# Patient Record
(demographics unavailable — no encounter records)

---

## 2024-10-09 NOTE — HISTORY OF PRESENT ILLNESS
[FreeTextEntry1] : Ms. SAUL AMADOR is a 59 year old female with a past medical history of PTC s/p total thyroidectomy, Iatrogenic hypothyroidism, Diabetes Mellitus Type 2  who presents for management.  Patient has not been able to exercise.  She has a grandson with cystic fibrosis that has required at least 10 surgeries.  She helps taking care of the grandson who lives with her. She had another grandchild that was recently born with [place] syndrome.  She has cut down on her metformin due to low blood sugars.  Re Thyroid Patient was first diagnosed with thyroid disorder in 2020. She had nodules for many years before that. She did not have JOHN. She  takes levothyroxine 150 mcg QD. Patient takes it in an empty stomach 30-60 mins before breakfast in the morning.   Re Diabetes Mellitus Patient denies any history of diabetic retinopathy, nephropathy or neuropathy. He denies any blurry vision, polyuria, polydipsia and numbness/tingling in the extremities.   POCT Glucose: mg/dL POCT Hga1c:%   Diabetes first diagnosed: 2 Type:10+ years  Current diabetic regimen: Metformin ER 1000 mg BID Other relevant medications:  Self monitoring blood glucose : Sometimes  SMBG: Pre-breakfast: Pre-lunch: Pre-dinner: bedtime:  Hypoglycemia:   Diet: low carb  Exercise:  Urine micro: WNL (5/2024), lipid profile: LDL 67 (5/2024), last hba1c: 6% (5/2024), 5.3% (9/2022), 5.6% (6/2022) eye exam:2024 diabetic foot exam/podiatry:5/2024

## 2024-10-09 NOTE — HISTORY OF PRESENT ILLNESS
[FreeTextEntry1] : Ms. SAUL AMADOR is a 59 year old female with a past medical history of PTC s/p total thyroidectomy, Iatrogenic hypothyroidism, Diabetes Mellitus Type 2  who presents for management.  Patient has not been able to exercise.  She has a grandson with cystic fibrosis that has required at least 10 surgeries.  She helps taking care of the grandson who lives with her. She had another grandchild that was recently born with [place] syndrome.  She has cut down on her metformin due to low blood sugars.  Re Thyroid Patient was first diagnosed with thyroid disorder in 2020. She had nodules for many years before that. She did not have JOHN. She  takes levothyroxine 150 mcg QD. Patient takes it in an empty stomach 30-60 mins before breakfast in the morning.   Re Diabetes Mellitus Patient denies any history of diabetic retinopathy, nephropathy or neuropathy. He denies any blurry vision, polyuria, polydipsia and numbness/tingling in the extremities.   POCT Glucose: mg/dL POCT Hga1c:%   Diabetes first diagnosed: 2 Type:10+ years  Current diabetic regimen: Metformin ER 1000 mg BID Other relevant medications:  Self monitoring blood glucose : Sometimes  SMBG: Pre-breakfast: Pre-lunch: Pre-dinner: bedtime:  Hypoglycemia:   Diet: low carb  Exercise:  Urine micro: WNL (5/2024), lipid profile: LDL 67 (5/2024), last hba1c: 6% (5/2024), 5.3% (9/2022), 5.6% (6/2022) eye exam:2024 diabetic foot exam/podiatry:5/2024

## 2024-10-09 NOTE — ASSESSMENT
[FreeTextEntry1] : 59 year old female with a past medical history of PTC s/p total thyroidectomy, Iatrogenic hypothyroidism, Diabetes Mellitus Type 2 who presents for management  1. DM 2- controlled, uncomplicated Patient counseled on the importance of diabetic control and risk of complications. We discussed about microvascular disease and macrovascular disease. We discussed the importance of opthalmology evaluations annually or more frequent as necessary. We also discussed the importance of diabetes foot care. Some form of glucose monitoring is always advised. Maintaining a low carbohydrate/ADA diet and physical activity was discussed. Patient's diet and the necessary changes discussed. Reviewed over glycemic goals. Discussed other options for diabetes control.  Cont Metformin  mg BID Check fsg BID Cont ADA diet Cont exercise Labs will be sent by patient Ophthalmology Visit up to date Foot Exam up to date  2. PTC s/p total thyroidectomy Followed by Dr Zhang TGB UD TSH kept low for suppression  Continue levothyroxine 137 mcg QD US stable

## 2024-10-09 NOTE — PHYSICAL EXAM
[Well Healed Scar] : well healed scar [Right foot was examined, including] : right foot ~C was examined, including visual inspection with sensory and pulse exams [Left foot was examined, including] : left foot ~C was examined, including visual inspection with sensory and pulse exams [Normal] : normal [Full ROM] : with full range of motion [2+] : 2+ in the dorsalis pedis [Diminished Throughout Both Feet] : normal tactile sensation with monofilament testing throughout both feet [Alert] : alert [No Acute Distress] : no acute distress [Well Developed] : well developed [Normal Voice/Communication] : normal voice communication [No Rash] : no rash [Oriented x3] : oriented to person, place, and time [Well Nourished] : well nourished [Normal Sclera/Conjunctiva] : normal sclera/conjunctiva [EOMI] : extra ocular movement intact [No Proptosis] : no proptosis [Normal Oropharynx] : the oropharynx was normal [Thyroid Not Enlarged] : the thyroid was not enlarged [No Thyroid Nodules] : no palpable thyroid nodules [No Accessory Muscle Use] : no accessory muscle use [Clear to Auscultation] : lungs were clear to auscultation bilaterally [Normal S1, S2] : normal S1 and S2 [Normal Rate] : heart rate was normal [Regular Rhythm] : with a regular rhythm [No Edema] : no peripheral edema [Pedal Pulses Normal] : the pedal pulses are present [Normal Bowel Sounds] : normal bowel sounds [Not Tender] : non-tender [Not Distended] : not distended [Soft] : abdomen soft [Normal Anterior Cervical Nodes] : no anterior cervical lymphadenopathy [No Respiratory Distress] : no respiratory distress [No Spinal Tenderness] : no spinal tenderness [Spine Straight] : spine straight [No Stigmata of Cushings Syndrome] : no stigmata of Cushings Syndrome [Normal Gait] : normal gait [Normal Strength/Tone] : muscle strength and tone were normal [Acanthosis Nigricans] : no acanthosis nigricans [Normal Reflexes] : deep tendon reflexes were 2+ and symmetric [No Tremors] : no tremors

## 2025-02-13 NOTE — HISTORY OF PRESENT ILLNESS
[Home] : at home, [unfilled] , at the time of the visit. [Medical Office: ([facility])___] : at the medical office located in  [Telehealth (audio & video)] : This visit was provided via telehealth using real-time 2-way audio visual technology. [Verbal consent obtained from patient] : the patient, [unfilled] [FreeTextEntry1] : Ms. SAUL AMADOR is a 59 year old female with a past medical history of PTC s/p total thyroidectomy, Iatrogenic hypothyroidism, Diabetes Mellitus Type 2  who presents for management.  Patient is still undergoing stress due to her ill grandchildren.  She had blood work done recently  Re Thyroid Patient was first diagnosed with thyroid disorder in 2020. She had nodules for many years before that. She did not have JOHN. She  takes levothyroxine 150 mcg QD. Patient takes it in an empty stomach 30-60 mins before breakfast in the morning.   Re Diabetes Mellitus Patient denies any history of diabetic retinopathy, nephropathy or neuropathy. He denies any blurry vision, polyuria, polydipsia and numbness/tingling in the extremities.   POCT Glucose: mg/dL POCT Hga1c:%   Diabetes first diagnosed: 2 Type:10+ years  Current diabetic regimen: Metformin ER 1000 mg BID Other relevant medications:  Self monitoring blood glucose : Sometimes  SMBG: Pre-breakfast: Pre-lunch: Pre-dinner: bedtime:  Hypoglycemia:   Diet: low carb  Exercise:  Urine micro: WNL (5/2024), lipid profile: LDL 67 (5/2024), last hba1c: 6% (5/2024), 5.3% (9/2022), 5.6% (6/2022) eye exam:2024 diabetic foot exam/podiatry:5/2024

## 2025-02-13 NOTE — PHYSICAL EXAM
[Alert] : alert [Well Nourished] : well nourished [No Acute Distress] : no acute distress [Well Developed] : well developed [Normal Voice/Communication] : normal voice communication [Normal Sclera/Conjunctiva] : normal sclera/conjunctiva [EOMI] : extra ocular movement intact [No Proptosis] : no proptosis [Normal Oropharynx] : the oropharynx was normal [Thyroid Not Enlarged] : the thyroid was not enlarged [No Thyroid Nodules] : no palpable thyroid nodules [No Respiratory Distress] : no respiratory distress [No Accessory Muscle Use] : no accessory muscle use [Clear to Auscultation] : lungs were clear to auscultation bilaterally [Normal S1, S2] : normal S1 and S2 [Normal Rate] : heart rate was normal [Regular Rhythm] : with a regular rhythm [No Edema] : no peripheral edema [Pedal Pulses Normal] : the pedal pulses are present [Normal Bowel Sounds] : normal bowel sounds [Not Tender] : non-tender [Not Distended] : not distended [Soft] : abdomen soft [Normal Anterior Cervical Nodes] : no anterior cervical lymphadenopathy [No Spinal Tenderness] : no spinal tenderness [Spine Straight] : spine straight [No Stigmata of Cushings Syndrome] : no stigmata of Cushings Syndrome [Normal Gait] : normal gait [Normal Strength/Tone] : muscle strength and tone were normal [No Rash] : no rash [Normal Reflexes] : deep tendon reflexes were 2+ and symmetric [No Tremors] : no tremors [Oriented x3] : oriented to person, place, and time [Acanthosis Nigricans] : no acanthosis nigricans [de-identified] : deferred

## 2025-02-13 NOTE — ASSESSMENT
[FreeTextEntry1] : 59 year old female with a past medical history of PTC s/p total thyroidectomy, Iatrogenic hypothyroidism, Diabetes Mellitus Type 2 who presents for management  1. DM 2- controlled, uncomplicated Patient counseled on the importance of diabetic control and risk of complications. We discussed about microvascular disease and macrovascular disease. We discussed the importance of opthalmology evaluations annually or more frequent as necessary. We also discussed the importance of diabetes foot care. Some form of glucose monitoring is always advised. Maintaining a low carbohydrate/ADA diet and physical activity was discussed. Patient's diet and the necessary changes discussed. Reviewed over glycemic goals. Discussed other options for diabetes control.  Cont Metformin  mg BID Check fsg BID Cont ADA diet Cont exercise Labs will be sent by patient Ophthalmology Visit up to date Foot Exam up to date  2. PTC s/p total thyroidectomy Followed by Dr Zhang TGB is detectable but the assay was recently changed Given the inconsistent result, will send fir early thyroid US TSH kept low for suppression  Continue levothyroxine 137 mcg QD

## 2025-02-13 NOTE — PHYSICAL EXAM
[Alert] : alert [Well Nourished] : well nourished [No Acute Distress] : no acute distress [Well Developed] : well developed [Normal Voice/Communication] : normal voice communication [Normal Sclera/Conjunctiva] : normal sclera/conjunctiva [EOMI] : extra ocular movement intact [No Proptosis] : no proptosis [Normal Oropharynx] : the oropharynx was normal [Thyroid Not Enlarged] : the thyroid was not enlarged [No Thyroid Nodules] : no palpable thyroid nodules [No Respiratory Distress] : no respiratory distress [No Accessory Muscle Use] : no accessory muscle use [Clear to Auscultation] : lungs were clear to auscultation bilaterally [Normal S1, S2] : normal S1 and S2 [Normal Rate] : heart rate was normal [Regular Rhythm] : with a regular rhythm [No Edema] : no peripheral edema [Pedal Pulses Normal] : the pedal pulses are present [Normal Bowel Sounds] : normal bowel sounds [Not Tender] : non-tender [Not Distended] : not distended [Soft] : abdomen soft [Normal Anterior Cervical Nodes] : no anterior cervical lymphadenopathy [No Spinal Tenderness] : no spinal tenderness [Spine Straight] : spine straight [No Stigmata of Cushings Syndrome] : no stigmata of Cushings Syndrome [Normal Gait] : normal gait [Normal Strength/Tone] : muscle strength and tone were normal [No Rash] : no rash [Normal Reflexes] : deep tendon reflexes were 2+ and symmetric [No Tremors] : no tremors [Oriented x3] : oriented to person, place, and time [Acanthosis Nigricans] : no acanthosis nigricans [de-identified] : deferred

## 2025-02-13 NOTE — HISTORY OF PRESENT ILLNESS
[Home] : at home, [unfilled] , at the time of the visit. [Medical Office: ([facility])___] : at the medical office located in  [Telehealth (audio & video)] : This visit was provided via telehealth using real-time 2-way audio visual technology. [Verbal consent obtained from patient] : the patient, [unfilled] [FreeTextEntry1] : Ms. SAUL AMADOR is a 59 year old female with a past medical history of PTC s/p total thyroidectomy, Iatrogenic hypothyroidism, Diabetes Mellitus Type 2  who presents for management.  Patient is still undergoing stress due to her ill grandchildren.  She had blood work done recently  Re Thyroid Patient was first diagnosed with thyroid disorder in 2020. She had nodules for many years before that. She did not have JOHN. She  takes levothyroxine 150 mcg QD. Patient takes it in an empty stomach 30-60 mins before breakfast in the morning.   Re Diabetes Mellitus Patient denies any history of diabetic retinopathy, nephropathy or neuropathy. He denies any blurry vision, polyuria, polydipsia and numbness/tingling in the extremities.   POCT Glucose: mg/dL POCT Hga1c:%   Diabetes first diagnosed: 2 Type:10+ years  Current diabetic regimen: Metformin ER 1000 mg BID Other relevant medications:  Self monitoring blood glucose : Sometimes  SMBG: Pre-breakfast: Pre-lunch: Pre-dinner: bedtime:  Hypoglycemia:   Diet: low carb  Exercise:  Urine micro: WNL (5/2024), lipid profile: LDL 67 (5/2024), last hba1c: 6% (5/2024), 5.3% (9/2022), 5.6% (6/2022) eye exam:2024 diabetic foot exam/podiatry:5/2024